# Patient Record
Sex: MALE | URBAN - METROPOLITAN AREA
[De-identification: names, ages, dates, MRNs, and addresses within clinical notes are randomized per-mention and may not be internally consistent; named-entity substitution may affect disease eponyms.]

---

## 2021-08-18 ENCOUNTER — APPOINTMENT (RX ONLY)
Dept: URBAN - METROPOLITAN AREA CLINIC 55 | Facility: CLINIC | Age: 26
Setting detail: DERMATOLOGY
End: 2021-08-18

## 2021-08-18 DIAGNOSIS — L73.8 OTHER SPECIFIED FOLLICULAR DISORDERS: ICD-10-CM

## 2021-08-18 PROCEDURE — ? COUNSELING

## 2021-08-18 PROCEDURE — ? ADDITIONAL NOTES

## 2021-08-18 NOTE — PROCEDURE: ADDITIONAL NOTES
Detail Level: Simple
Render Risk Assessment In Note?: no
Additional Notes: Quoted 50 for a test spot upon arrival day of treatment

## 2021-08-19 ENCOUNTER — APPOINTMENT (RX ONLY)
Dept: URBAN - METROPOLITAN AREA CLINIC 55 | Facility: CLINIC | Age: 26
Setting detail: DERMATOLOGY
End: 2021-08-19

## 2021-08-19 DIAGNOSIS — L73.8 OTHER SPECIFIED FOLLICULAR DISORDERS: ICD-10-CM

## 2021-08-19 PROCEDURE — ? ADDITIONAL NOTES

## 2021-08-19 PROCEDURE — ? BENIGN DESTRUCTION: CO2 LASER

## 2021-08-19 ASSESSMENT — LOCATION DETAILED DESCRIPTION DERM
LOCATION DETAILED: LEFT INFERIOR VERMILION LIP
LOCATION DETAILED: LEFT SUPERIOR VERMILION LIP

## 2021-08-19 ASSESSMENT — LOCATION SIMPLE DESCRIPTION DERM: LOCATION SIMPLE: LEFT LIP

## 2021-08-19 ASSESSMENT — LOCATION ZONE DERM: LOCATION ZONE: LIP

## 2021-08-19 NOTE — PROCEDURE: ADDITIONAL NOTES
Render Risk Assessment In Note?: no
Detail Level: Simple
Additional Notes: Test spot on lip with co2

## 2021-08-19 NOTE — PROCEDURE: BENIGN DESTRUCTION: CO2 LASER
Add 52 Modifier (Optional): no
Post-Care Statement: Following the procedure, vaseline and a bandage were applied.
Medical Necessity Clause: This procedure was medically necessary because the lesions that were treated were:
Treatment Number: 1
Laser Type: [place] 50 CO2 Laser
Post-Care Instructions: I reviewed with the patient in detail post-care instructions. Patient should avoid sun until the area is fully healed. Pt should apply vaseline to treated areas.
Spot Sizes: 200um
Passes: several
Pulse Duration (Usec): 10
Laser Mode: Fractionated
Wavelength: 10,600nm
Power (Agrawal-Leave At Zero If Unwanted): [address]
External Cooling Fan Speed: 3
Was Feathering Performed?: Yes
Consent: Written consent obtained, risks reviewed including but not limited to crusting, scabbing, blistering, scarring, darker or lighter pigmentary change, incomplete improvement, infection and bleeding.
Medical Necessity Information: It is in your best interest to select a reason for this procedure from the list below. All of these items fulfill various CMS LCD requirements except the new and changing color options.
Feathering Statement: Following the treatment the wound edges were feathered using
Energy(Mj-Leave At Zero If Unwanted): 0
Detail Level: Detailed

## 2021-09-16 ENCOUNTER — APPOINTMENT (RX ONLY)
Dept: URBAN - METROPOLITAN AREA CLINIC 55 | Facility: CLINIC | Age: 26
Setting detail: DERMATOLOGY
End: 2021-09-16

## 2021-09-16 DIAGNOSIS — Z41.9 ENCOUNTER FOR PROCEDURE FOR PURPOSES OTHER THAN REMEDYING HEALTH STATE, UNSPECIFIED: ICD-10-CM

## 2021-09-16 PROCEDURE — ? LASER COSMETIC

## 2021-09-16 ASSESSMENT — LOCATION SIMPLE DESCRIPTION DERM
LOCATION SIMPLE: LEFT LIP
LOCATION SIMPLE: RIGHT LIP

## 2021-09-16 ASSESSMENT — LOCATION DETAILED DESCRIPTION DERM
LOCATION DETAILED: LEFT SUPERIOR VERMILION LIP
LOCATION DETAILED: RIGHT INFERIOR VERMILION LIP

## 2021-09-16 ASSESSMENT — LOCATION ZONE DERM: LOCATION ZONE: LIP

## 2021-09-16 NOTE — HPI: COSMETIC (LASER RESURFACING)
Have You Had Laser Resurfacing Before?: has not had previous treatments
Additional History: Test spot was done previously

## 2021-09-16 NOTE — PROCEDURE: LASER COSMETIC
Render Post-Care In The Note: No
End-Point And Post-Procedure Care: The procedure continued until mild purpura was noted. Immediately following the procedure, Vaseline and ice applied. Post care reviewed with patient.
Treatment Number: 1
Eye Protection: wavelength-specific goggles
Fluence (Include Units): 3.5
Consent: Prior to the procedure consent obtained, risks reviewed including but not limited to crusting, scabbing, blistering, scarring, darker or lighter pigmentary change, incidental hair removal, bruising, and/or incomplete removal.
Anesthesia Volume In Cc: 0
Pre-Procedure Care: Prior to the procedure the patient and all present had protective eyewear in place and a warning sign was placed on the door. \\nHydroquinone x 21 days and valtrex same day as  procedure
Indication: CO2
Detail Level: Zone
Price (Use Numbers Only, No Special Characters Or $): 100
Number Of Passes: 2
Wavelength (Include Units): [address]
Laser Type: lumenis
Post-Care Instructions: 1 week post-op; healing very well